# Patient Record
Sex: MALE | Race: WHITE | NOT HISPANIC OR LATINO | Employment: FULL TIME | URBAN - METROPOLITAN AREA
[De-identification: names, ages, dates, MRNs, and addresses within clinical notes are randomized per-mention and may not be internally consistent; named-entity substitution may affect disease eponyms.]

---

## 2017-07-28 ENCOUNTER — ALLSCRIPTS OFFICE VISIT (OUTPATIENT)
Dept: OTHER | Facility: OTHER | Age: 57
End: 2017-07-28

## 2017-07-28 DIAGNOSIS — Z00.00 ENCOUNTER FOR GENERAL ADULT MEDICAL EXAMINATION WITHOUT ABNORMAL FINDINGS: ICD-10-CM

## 2017-07-28 DIAGNOSIS — R09.89 OTHER SPECIFIED SYMPTOMS AND SIGNS INVOLVING THE CIRCULATORY AND RESPIRATORY SYSTEMS: ICD-10-CM

## 2017-07-28 DIAGNOSIS — Z12.5 ENCOUNTER FOR SCREENING FOR MALIGNANT NEOPLASM OF PROSTATE: ICD-10-CM

## 2017-07-28 DIAGNOSIS — K22.4 DYSKINESIA OF ESOPHAGUS: ICD-10-CM

## 2017-07-28 DIAGNOSIS — E78.5 HYPERLIPIDEMIA: ICD-10-CM

## 2017-08-10 ENCOUNTER — APPOINTMENT (OUTPATIENT)
Dept: LAB | Facility: CLINIC | Age: 57
End: 2017-08-10
Payer: COMMERCIAL

## 2017-08-10 ENCOUNTER — GENERIC CONVERSION - ENCOUNTER (OUTPATIENT)
Dept: OTHER | Facility: OTHER | Age: 57
End: 2017-08-10

## 2017-08-10 DIAGNOSIS — K22.4 DYSKINESIA OF ESOPHAGUS: ICD-10-CM

## 2017-08-10 DIAGNOSIS — E78.5 HYPERLIPIDEMIA: ICD-10-CM

## 2017-08-10 DIAGNOSIS — Z12.5 ENCOUNTER FOR SCREENING FOR MALIGNANT NEOPLASM OF PROSTATE: ICD-10-CM

## 2017-08-10 LAB
ALBUMIN SERPL BCP-MCNC: 4 G/DL (ref 3.5–5)
ALP SERPL-CCNC: 35 U/L (ref 46–116)
ALT SERPL W P-5'-P-CCNC: 24 U/L (ref 12–78)
ANION GAP SERPL CALCULATED.3IONS-SCNC: 6 MMOL/L (ref 4–13)
AST SERPL W P-5'-P-CCNC: 23 U/L (ref 5–45)
BASOPHILS # BLD AUTO: 0.02 THOUSANDS/ΜL (ref 0–0.1)
BASOPHILS NFR BLD AUTO: 1 % (ref 0–1)
BILIRUB SERPL-MCNC: 0.81 MG/DL (ref 0.2–1)
BUN SERPL-MCNC: 21 MG/DL (ref 5–25)
CALCIUM SERPL-MCNC: 8.9 MG/DL (ref 8.3–10.1)
CHLORIDE SERPL-SCNC: 102 MMOL/L (ref 100–108)
CHOLEST SERPL-MCNC: 210 MG/DL (ref 50–200)
CO2 SERPL-SCNC: 26 MMOL/L (ref 21–32)
CREAT SERPL-MCNC: 1.08 MG/DL (ref 0.6–1.3)
EOSINOPHIL # BLD AUTO: 0.06 THOUSAND/ΜL (ref 0–0.61)
EOSINOPHIL NFR BLD AUTO: 2 % (ref 0–6)
ERYTHROCYTE [DISTWIDTH] IN BLOOD BY AUTOMATED COUNT: 13.4 % (ref 11.6–15.1)
GFR SERPL CREATININE-BSD FRML MDRD: 76 ML/MIN/1.73SQ M
GLUCOSE P FAST SERPL-MCNC: 87 MG/DL (ref 65–99)
HCT VFR BLD AUTO: 41.7 % (ref 36.5–49.3)
HDLC SERPL-MCNC: 45 MG/DL (ref 40–60)
HGB BLD-MCNC: 14.5 G/DL (ref 12–17)
LDLC SERPL CALC-MCNC: 153 MG/DL (ref 0–100)
LYMPHOCYTES # BLD AUTO: 1.1 THOUSANDS/ΜL (ref 0.6–4.47)
LYMPHOCYTES NFR BLD AUTO: 27 % (ref 14–44)
MCH RBC QN AUTO: 31.5 PG (ref 26.8–34.3)
MCHC RBC AUTO-ENTMCNC: 34.8 G/DL (ref 31.4–37.4)
MCV RBC AUTO: 91 FL (ref 82–98)
MONOCYTES # BLD AUTO: 0.35 THOUSAND/ΜL (ref 0.17–1.22)
MONOCYTES NFR BLD AUTO: 9 % (ref 4–12)
NEUTROPHILS # BLD AUTO: 2.51 THOUSANDS/ΜL (ref 1.85–7.62)
NEUTS SEG NFR BLD AUTO: 61 % (ref 43–75)
NRBC BLD AUTO-RTO: 0 /100 WBCS
PLATELET # BLD AUTO: 212 THOUSANDS/UL (ref 149–390)
PMV BLD AUTO: 10.2 FL (ref 8.9–12.7)
POTASSIUM SERPL-SCNC: 3.9 MMOL/L (ref 3.5–5.3)
PROT SERPL-MCNC: 6.9 G/DL (ref 6.4–8.2)
PSA SERPL-MCNC: 0.7 NG/ML (ref 0–4)
RBC # BLD AUTO: 4.6 MILLION/UL (ref 3.88–5.62)
SODIUM SERPL-SCNC: 134 MMOL/L (ref 136–145)
TRIGL SERPL-MCNC: 60 MG/DL
WBC # BLD AUTO: 4.05 THOUSAND/UL (ref 4.31–10.16)

## 2017-08-10 PROCEDURE — 36415 COLL VENOUS BLD VENIPUNCTURE: CPT

## 2017-08-10 PROCEDURE — G0103 PSA SCREENING: HCPCS

## 2017-08-10 PROCEDURE — 80061 LIPID PANEL: CPT

## 2017-08-10 PROCEDURE — 84402 ASSAY OF FREE TESTOSTERONE: CPT

## 2017-08-10 PROCEDURE — 85025 COMPLETE CBC W/AUTO DIFF WBC: CPT

## 2017-08-10 PROCEDURE — 84403 ASSAY OF TOTAL TESTOSTERONE: CPT

## 2017-08-10 PROCEDURE — 80053 COMPREHEN METABOLIC PANEL: CPT

## 2017-08-11 LAB
TESTOST FREE SERPL-MCNC: 18.9 PG/ML (ref 7.2–24)
TESTOST SERPL-MCNC: 970 NG/DL (ref 264–916)

## 2017-08-14 ENCOUNTER — GENERIC CONVERSION - ENCOUNTER (OUTPATIENT)
Dept: OTHER | Facility: OTHER | Age: 57
End: 2017-08-14

## 2017-08-23 ENCOUNTER — APPOINTMENT (OUTPATIENT)
Dept: NON INVASIVE DIAGNOSTICS | Facility: HOSPITAL | Age: 57
End: 2017-08-23
Payer: COMMERCIAL

## 2017-08-23 ENCOUNTER — HOSPITAL ENCOUNTER (OUTPATIENT)
Dept: NON INVASIVE DIAGNOSTICS | Facility: HOSPITAL | Age: 57
Discharge: HOME | End: 2017-08-23
Payer: COMMERCIAL

## 2017-08-23 ENCOUNTER — HOSPITAL ENCOUNTER (OUTPATIENT)
Dept: CT IMAGING | Facility: HOSPITAL | Age: 57
Discharge: HOME | End: 2017-08-23
Payer: COMMERCIAL

## 2017-08-23 DIAGNOSIS — Z00.00 ENCOUNTER FOR GENERAL ADULT MEDICAL EXAMINATION WITHOUT ABNORMAL FINDINGS: ICD-10-CM

## 2017-08-23 DIAGNOSIS — R09.89 OTHER SPECIFIED SYMPTOMS AND SIGNS INVOLVING THE CIRCULATORY AND RESPIRATORY SYSTEMS: ICD-10-CM

## 2017-08-23 PROCEDURE — 93880 EXTRACRANIAL BILAT STUDY: CPT

## 2017-08-24 ENCOUNTER — GENERIC CONVERSION - ENCOUNTER (OUTPATIENT)
Dept: OTHER | Facility: OTHER | Age: 57
End: 2017-08-24

## 2017-11-10 DIAGNOSIS — E78.5 HYPERLIPIDEMIA: ICD-10-CM

## 2018-01-10 NOTE — PROGRESS NOTES
Assessment    1  Well adult on routine health check (V70 0) (Z00 00)   2  OCD (obsessive compulsive disorder) (300 3) (F42 9)   3  Anxiety (300 00) (F41 9)   4  Never smoker   5  Hyperlipidemia (272 4) (E78 5)   6  Vertigo (780 4) (R42)   7  Tinnitus of both ears (388 30) (H93 13)   8  Esophageal spasm (530 5) (K22 4)   9  Hypogonadism   10  Erectile dysfunction (607 84) (N52 9)   11  Prostate cancer screening (V76 44) (Z12 5)   12  Colon polyps (211 3) (K63 5)   13  Acne (706 1) (L70 9)   14  Cyst   15  Bruit of right carotid artery (785 9) (R09 89)    Plan  Acne    · Benzoyl Peroxide 5 % External Gel; APPLY SPARINGLY TO THE AFFECTED  AREA(S) ONCE OR TWICE DAILY AS DIRECTED  Anxiety, OCD (obsessive compulsive disorder)    · From  Citalopram Hydrobromide 40 MG Oral Tablet TAKE 1 TABLET BY  MOUTH ONCE DAILY To CeleXA 40 MG Oral Tablet (Citalopram Hydrobromide) take  one tablet by mouth daily  Bruit of right carotid artery    · VAS CAROTID COMPLETE STUDY; SIDE:Bilateral; Status:Active; Requested  for:80Nms6435;   Colon polyps    · 1 - Isak Tillman MD, Denise Reyes (Gastroenterology) Co-Management  *  Status: Active  Requested  for: 35OJP5750  Care Summary provided  : Yes  Cyst    · 1 - Taina JACINTO, [facility] Co-Management  *  Status: Active   Requested for: 61BNT3244  Care Summary provided  : Yes  Erectile dysfunction    · Cialis 20 MG Oral Tablet; take one half to one tablet daily as needed  Erectile dysfunction, Hypogonadism    · 1 - Cam JACINTO, Kenji Georges  (Urology) Co-Management  *  Status: Active  Requested for:  08JHI1396  Care Summary provided  : Yes  Esophageal spasm    · Chlordiazepoxide-Clidinium 5-2 5 MG Oral Capsule; TAKE 1 CAPSULE BY  MOUTH TWICE DAILY  Esophageal spasm, Hypogonadism    · (1) CBC/PLT/DIFF; Status:Active; Requested for:11Nxc9195;   Hyperlipidemia    · (1) COMPREHENSIVE METABOLIC PANEL; Status:Active;  Requested for:14Cpg1022;    · (1) LIPID PANEL, FASTING; Status:Active; Requested for:75Nml9843;   Hyperlipidemia, Hypogonadism    · (1) TESTOSTERONE, FREE (DIRECT) AND TOTAL; Status:Active; Requested  for:56Szc9346;   Prostate cancer screening    · (1) PSA (SCREEN) (Dx V76 44 Screen for Prostate Cancer); Status:Active; Requested  for:99Ssb3893; Well adult on routine health check    · CT CORONARY CALCIUM SCORE; Status:Active; Requested for:23Ovz1440; Unlinked    · Atorvastatin Calcium 20 MG Oral Tablet    Discussion/Summary  health maintenance visit Currently, he eats a healthy diet  Prostate cancer screening: PSA was ordered  Patient presents today for his initial visit to our office  Overall, he is in excellent health  He does have history of hyperlipidemia  He has been on statin medication for least a decade  I explained that the guidelines have changed a bit and I would like him to get a baseline lipid panel and we will check his 10 year risk  He remains on testosterone supplementation and I am going to have him see a urologist  He does have a moderately enlarged prostate but no concerning nodules  PSA will be checked as well as a CBC on the testosterone  He has a history of a few colon polyps on colonoscopy about 5 years ago  We do not have the record yet, but he would be due for repeat colonoscopy, so I have referred him to GI  He has a cystic lesion on his back which is probably a persistent sebaceous cyst and I'm referring him to plastic surgery to have this removed  He has some mild erectile dysfunction and I gave him a prescription for Cialis  He has a very slight right-sided carotid bruit and I sent him for a carotid screening ultrasound  He has a history of OCD which is well-controlled with his SSRI  He has a history of esophageal spasm and I refilled his chlordiazepoxide  He is very interested in aggressive vascular screening, so I set him up for a coronary calcium score        Chief Complaint  Physical/ Establish care      History of Present Illness  HM, Adult Male: The patient is being seen for a health maintenance evaluation  General Health: The patient's health since the last visit is described as good  Lifestyle:  He consumes a diverse and healthy diet  Screening:   HPI: The patient presents today for his initial visit to our office  He would like an annual physical  He is a lower at [employer] having moved to the area 1 year ago from [place]  He is originally from [place] and went to the [facility]  He comes today to get established and also require some chronic medication maintenance  He has a history of OCD with some anxiety which has been well controlled with Celexa for many years  He has history of low testosterone and remains on testosterone supplementation  He has had some chronic mild erectile dysfunction  He noted significant improvement in mood and lean body mass with initiation of testosterone several years ago  He was admitted to the hospital overnight in [place] after experiencing severe chest pain a year ago  Fortunately, all of his cardiac testing was negative and was felt to be a esophageal spasm  He was placed on chlordiazepoxide as needed at the time which has been quite helpful  He denies any current problems with heartburn or dysphagia, but does continue to get some intermittent tightness in his chest consistent with that initial episode  He remains quite active with exercise and does not have any problems with chest pain, shortness of breath or diaphoresis with exertion  He has a history of hyperlipidemia has been on atorvastatin for about a decade  He has experienced some acne on his back over the past several years which is quite frustrating  He also has a cystic structure on his left shoulder area that is quite bothersome   At times it has gotten quite large consistent with a sebaceous cyst       Review of Systems    Constitutional: no fever, not feeling poorly, no recent weight gain, no chills, not feeling tired and no recent weight loss  Eyes: no eyesight problems and no dryness of the eyes  Cardiovascular: the heart rate was not slow, no chest pain, no intermittent leg claudication, no palpitations and no extremity edema  Respiratory: no shortness of breath, no cough, no wheezing and no shortness of breath during exertion  Gastrointestinal: no abdominal pain, no nausea, no vomiting, no constipation and no diarrhea  Genitourinary: no dysuria, no urinary hesitancy, no incontinence and no nocturia  Musculoskeletal: no arthralgias, no joint swelling, no myalgias and no joint stiffness  Integumentary: no rashes  Neurological: no headache  Psychiatric: no anxiety and no depression  Hematologic/Lymphatic: no tendency for easy bleeding and no tendency for easy bruising  Surgical History    · History of Hemorrhoidectomy    Family History  Father    · Family history of cerebrovascular accident (CVA) (V17 1) (Z82 3)    Social History    · Never smoker   · No alcohol use    Current Meds   1  Aspirin Low Dose 81 MG TABS; TAKE 1 TABLET DAILY; Therapy: (Recorded:78Wpo3383) to Recorded   2  Atorvastatin Calcium 20 MG Oral Tablet; TAKE 1 TABLET DAILY AS DIRECTED; Therapy: (Recorded:28Jul2017) to Recorded   3  CeleXA 40 MG Oral Tablet; TAKE 1 TABLET BY MOUTH ONCE DAILY; Therapy: (Recorded:24Poe6410) to Recorded   4  Lipitor 20 MG Oral Tablet; TAKE 1 TABLET DAILY; Therapy: (Recorded:18Nwj1965) to Recorded   5  Testosterone Cypionate 200 MG/ML Intramuscular Solution; INJECT 1 ML Intramuscular   every 2 weeks; Therapy: (Recorded:84Bqh5425) to Recorded   6  Vitamin C 1000 MG Oral Tablet; TAKE 1 TABLET DAILY; Therapy: (Recorded:18Yaz6775) to Recorded   7  Vitamin D3 2000 UNIT Oral Capsule; take 1 capsule daily; Therapy: (Recorded:23Mxr1716) to Recorded    Allergies    1   No Known Drug Allergies    Vitals   Recorded: 28Jul2017 02:15PM   Heart Rate 76   Respiration 16   Systolic 301 Diastolic 72   Height 5 ft 6 in   Weight 186 lb    BMI Calculated 30 02   BSA Calculated 1 94     Physical Exam    Constitutional   General appearance: No acute distress, well appearing and well nourished  Head and Face   Head and face: Normal     Palpation of the face and sinuses: No sinus tenderness  Eyes   Conjunctiva and lids: No erythema, swelling or discharge  Pupils and irises: Equal, round, reactive to light  Ophthalmoscopic examination: Normal fundi and optic discs  Ears, Nose, Mouth, and Throat   Otoscopic examination: Tympanic membranes translucent with normal light reflex  Canals patent without erythema  Hearing: Normal     Neck   Neck: Supple, symmetric, trachea midline, no masses  Thyroid: Normal, no thyromegaly  Pulmonary   Respiratory effort: No increased work of breathing or signs of respiratory distress  Palpation of chest: Normal     Auscultation of lungs: Clear to auscultation  Cardiovascular   Auscultation of heart: Normal rate and rhythm, normal S1 and S2, no murmurs  Carotid pulses: 2+ bilaterally  Pedal pulses: 2+ bilaterally  Examination of extremities for edema and/or varicosities: Normal     Abdomen   Abdomen: Non-tender, no masses  Liver and spleen: No hepatomegaly or splenomegaly  Anus, perineum, and rectum: Normal sphincter tone, no masses, no prolapse  Stool sample for occult blood: Negative  Genitourinary   Digital rectal exam of prostate: Abnormal   The prostate was enlarged, but was normal, had no palpable nodules, was nontender and was not fluctuant  Lymphatic   Palpation of lymph nodes in neck: No lymphadenopathy  Musculoskeletal   Gait and station: Normal     Inspection/palpation of digits and nails: Normal without clubbing or cyanosis  Range of motion: Normal     Muscle strength/tone: Normal     Skin   Skin and subcutaneous tissue: Normal without rashes or lesions      Palpation of skin and subcutaneous tissue: Normal juanito  Neurologic   Cranial nerves: Cranial nerves 2-12 intact  Cortical function: Normal mental status  Psychiatric   Judgment and insight: Normal     Orientation to person, place and time: Normal     Recent and remote memory: Intact  Mood and affect: Normal        Results/Data  PHQ-2 Adult Depression Screening 63Gej9930 02:18PM User, Slava     Test Name Result Flag Reference   PHQ-2 Adult Depression Score 0     Over the last two weeks, how often have you been bothered by any of the following problems? Little interest or pleasure in doing things: Not at all - 0  Feeling down, depressed, or hopeless: Not at all - 0   PHQ-2 Adult Depression Screening Negative         Future Appointments    Date/Time Provider Specialty Site   01/12/2018 02:00 PM CRISTOBAL Grewal   Family Medicine [facility] [address]     Signatures   Electronically signed by : CRISTOBAL Alcaraz ; Jul 29 2017  9:13AM EST                       (Author)

## 2018-01-10 NOTE — RESULT NOTES
Verified Results  (1) TESTOSTERONE, FREE (DIRECT) AND TOTAL 10Aug2017 07:23AM Gloria Strauss Order Number: [id number]     Test Name Result Flag Reference   FREE TESTOSTERONE, DIRECT 18 9 pg/mL  7 2 - 24 0   TESTOSTERONE (TOTAL) 970 ng/dL H 264 - 916   **Please note reference interval change**  Adult male reference interval is based on a population of  healthy nonobese males (BMI <30) between 23and 44years old  [address] [place] [address], [address] [place] [address] [phone]  PMID: [id number]      Performed at:  [address]  [id number]  : Aniceto Steiner MD, Phone:  [phone]

## 2018-01-11 NOTE — RESULT NOTES
Verified Results  (1) COMPREHENSIVE METABOLIC PANEL [id number] 77:08TD Lennie Lynne   TW Order Number: [id number]     Test Name Result Flag Reference   SODIUM 134 mmol/L L 136-145   POTASSIUM 3 9 mmol/L  3 5-5 3   CHLORIDE 102 mmol/L  100-108   CARBON DIOXIDE 26 mmol/L  21-32   ANION GAP (CALC) 6 mmol/L  4-13   BLOOD UREA NITROGEN 21 mg/dL  5-25   CREATININE 1 08 mg/dL  0 60-1 30   Standardized to IDMS reference method   CALCIUM 8 9 mg/dL  8 3-10 1   BILI, TOTAL 0 81 mg/dL  0 20-1 00   ALK PHOSPHATAS 35 U/L L    ALT (SGPT) 24 U/L  12-78   Specimen collection should occur prior to Sulfasalazine and/or Sulfapyridine administration due to the potential for falsely depressed results  AST(SGOT) 23 U/L  5-45   Specimen collection should occur prior to Sulfasalazine administration due to the potential for falsely depressed results  ALBUMIN 4 0 g/dL  3 5-5 0   TOTAL PROTEIN 6 9 g/dL  6 4-8 2   eGFR 76 ml/min/1 73sq m     National Kidney Disease Education Program recommendations are as follows:  GFR calculation is accurate only with a steady state creatinine  Chronic Kidney disease less than 60 ml/min/1 73 sq  meters  Kidney failure less than 15 ml/min/1 73 sq  meters  GLUCOSE FASTING 87 mg/dL  65-99   Specimen collection should occur prior to Sulfasalazine administration due to the potential for falsely depressed results  Specimen collection should occur prior to Sulfapyridine administration due to the potential for falsely elevated results  (1) LIPID PANEL, FASTING 10Aug2017 07:23AM Lennie Lynne   TW Order Number: [id number]     Test Name Result Flag Reference   CHOLESTEROL 210 mg/dL H    HDL,DIRECT 45 mg/dL  40-60   Specimen collection should occur prior to Metamizole administration due to the potential for falsley depressed results     LDL CHOLESTEROL CALCULATED 153 mg/dL H 0-100   Triglyceride:        Normal ??? ??? ??? ??? ??? ??? ??? <150 mg/dl   ??? ??? ???Borderline High ??? ??? 150-199 mg/dl   ??? ??? ? ?? High ??? ??? ??? ??? ??? ??? ??? 200-499 mg/dl   ??? ??? ? ??Very High ??? ??? ??? ??? ??? >499 mg/dl      Cholesterol:       Desirable ??? ??? ??? ??? <200 mg/dl   ??? ??? Borderline High ??? 200-239 mg/dl   ??? ??? High ??? ??? ??? ??? ??? ??? >239 mg/dl      HDL Cholesterol:       High ??? ???>59 mg/dL   ??? ??? Low ??? ??? <41 mg/dL      This screening LDL is a calculated result  It does not have the accuracy of the Direct Measured LDL in the monitoring of patients with hyperlipidemia and/or statin therapy  Direct Measure LDL (GXV965) must be ordered separately in these patients  TRIGLYCERIDES 60 mg/dL  <=150   Specimen collection should occur prior to N-Acetylcysteine or Metamizole administration due to the potential for falsely depressed results  (1) CBC/PLT/DIFF 20GGI0151 07:23AM [facility]: [id number]     Test Name Result Flag Reference   WBC COUNT 4 05 Thousand/uL L 4 31-10 16   RBC COUNT 4 60 Million/uL  3 88-5 62   HEMOGLOBIN 14 5 g/dL  12 0-17 0   HEMATOCRIT 41 7 %  36 5-49 3   MCV 91 fL  82-98   MCH 31 5 pg  26 8-34 3   MCHC 34 8 g/dL  31 4-37 4   RDW 13 4 %  11 6-15 1   MPV 10 2 fL  8 9-12 7   PLATELET COUNT 112 Thousands/uL  149-390   nRBC AUTOMATED 0 /100 WBCs     NEUTROPHILS RELATIVE PERCENT 61 %  43-75   LYMPHOCYTES RELATIVE PERCENT 27 %  14-44   MONOCYTES RELATIVE PERCENT 9 %  4-12   EOSINOPHILS RELATIVE PERCENT 2 %  0-6   BASOPHILS RELATIVE PERCENT 1 %  0-1   NEUTROPHILS ABSOLUTE COUNT 2 51 Thousands/? ??L  1 85-7 62   LYMPHOCYTES ABSOLUTE COUNT 1 10 Thousands/? ??L  0 60-4 47   MONOCYTES ABSOLUTE COUNT 0 35 Thousand/? ??L  0 17-1 22   EOSINOPHILS ABSOLUTE COUNT 0 06 Thousand/? ??L  0 00-0 61   BASOPHILS ABSOLUTE COUNT 0 02 Thousands/? ??L  0 00-0 10     (1) PSA (SCREEN) (Dx V76 44 Screen for Prostate Cancer) 10Aug2017 07:23AM Manolo Singer    Order Number: [id number]     Test Name Result Flag Reference   PROSTATE SPECIFIC ANTIGEN 0 7 ng/mL  0 0-4 0   American Urological Association Guidelines define biochemical recurrence of prostate cancer as a detectable or rising PSA value post-radical prostatectomy that is greater than or equal to 0 2 ng/mL with a second confirmatory level of greater than or equal to 0 2 ng/mL

## 2018-01-14 VITALS
HEIGHT: 66 IN | HEART RATE: 76 BPM | WEIGHT: 186 LBS | SYSTOLIC BLOOD PRESSURE: 138 MMHG | DIASTOLIC BLOOD PRESSURE: 72 MMHG | RESPIRATION RATE: 16 BRPM | BODY MASS INDEX: 29.89 KG/M2

## 2018-01-15 NOTE — RESULT NOTES
Verified Results  CT CORONARY CALCIUM SCORE [id number] 08:18AM Dwayne Blood Order Number: [id number]    - Patient Instructions: To schedule this appointment, please contact Central Scheduling at [phone]  Test Name Result Flag Reference   CT CORONARY CALCIUM SCORE (Report)     CT CORONARY ARTERY CALCIUM SCREENING WITHOUT INTRAVENOUS CONTRAST     INDICATION: Z00 00: Encounter for general adult medical examination without abnormal findings (this clinical history is taken directly from the electronic ordering system)  Family history of coronary artery disease  Assess for calcific coronary plaque  TECHNIQUE: Low radiation dose computed tomography of the heart was performed with EKG gating, suspended respiration, and without intravenous contrast  This examination, like all CT scans performed in the [facility], was performed    utilizing techniques to minimize radiation dose exposure, including the use of iterative reconstruction and automated exposure control  Postprocessing was performed on a 3-D computer workstation to measure the amount of calcium in the coronary arteries  Imaging was limited to the heart and the immediately adjacent lungs       FINDINGS:      Coronary artery calcium score breakdown is as follows (note: calcified atherosclerotic plaque in the posterior descending artery is included in right coronary artery score for right dominant circulation and left circumflex score for left dominant    circulation):     Left main coronary artery: 0   Left anterior descending coronary artery and diagonal branches: 15   Left circumflex coronary artery and left marginal branches: 0   Right coronary artery and right marginal branches: 0     TOTAL coronary calcium score: 15   Calcium score PERCENTILE of age, race, and gender matched database participants in the Multi-Ethnic Study of Atherosclerosis (GONZALEZ) trial:  51st       No significant abnormality is identified in the heart or visualized surrounding tissues  IMPRESSION:     Total cardiac score equals 15  For more useful information regarding the prognostic significance of the calcium score, please consult the calculator at the website [email or web address]  aspx  Workstation performed: [id number]     Signed by:   Dago Fuentes MD   8/24/17     VAS CAROTID COMPLETE STUDY [id number] 08:16AM Dwayne Linder Order Number: [id number]    - Patient Instructions: To schedule this appointment, please contact Central Scheduling at [phone]  Test Name Result Flag Reference   VAS CAROTID COMPLETE STUDY (Report)     [facility] REPORT   CLINICAL:   Indications: Bilateral Bruit [R09 89]  Patient presents for a general health evaluation secondary to other   cardiovascular symptoms  Patient is asymptomatic from a cerebral vascular   standpoint  Risk Factors   The patient has history of hyperlipidemia  He has no history of HTN or   Diabetes  Clinical   Right Brachial Pressure: 118/60 mmHg, Left Brachial Pressure: 120/64 mmHg  FINDINGS:      Right    PSV EDV (cm/s) Direction of Flow Ratio    Dist  ICA   68     16           0 56    Mid  ICA   68     18           0 56    Prox  ICA   70     16           0 58    Dist CCA   105     23                 Mid CCA   121     23           0 84    Prox CCA   145     22                 Ext Carotid 107     14           0 88    Prox Vert   56     17 Antegrade            Subclavian  139      0                    Left     PSV EDV (cm/s) Direction of Flow Ratio    Dist  ICA   70     23           0 55    Mid  ICA   97     31           0 77    Prox   ICA   83     28           0 65    Dist CCA   91     17                 Mid CCA   127     17           0 86    Prox CCA   148     24                 Ext Carotid  99     14           0 78    Prox Vert   52     15 Antegrade            Subclavian  148      0                          CONCLUSION:   Impression RIGHT:   There is no evidence of significant stenosis noted  Vertebral artery flow is antegrade  There is no significant subclavian artery   disease  LEFT:   There is no evidence of significant stenosis noted  Vertebral artery flow is antegrade  There is no significant subclavian artery   disease  Internal carotid artery stenosis determination by consensus criteria from:   John Palmer et al  Carotid Artery Stenosis: Gray-Scale and Doppler US Diagnosis   - Society of Radiologists in [address] [place] [address], Radiology 2003;   [phone]        SIGNATURE:   Electronically Signed by: Debbie Tolentino MD on 2017-08-23 09:34:55 PM

## 2018-03-09 ENCOUNTER — OFFICE VISIT (OUTPATIENT)
Dept: FAMILY MEDICINE CLINIC | Facility: CLINIC | Age: 58
End: 2018-03-09
Payer: COMMERCIAL

## 2018-03-09 ENCOUNTER — TRANSCRIBE ORDERS (OUTPATIENT)
Dept: LAB | Facility: CLINIC | Age: 58
End: 2018-03-09

## 2018-03-09 ENCOUNTER — APPOINTMENT (OUTPATIENT)
Dept: LAB | Facility: CLINIC | Age: 58
End: 2018-03-09
Payer: COMMERCIAL

## 2018-03-09 VITALS
DIASTOLIC BLOOD PRESSURE: 60 MMHG | SYSTOLIC BLOOD PRESSURE: 110 MMHG | HEART RATE: 68 BPM | OXYGEN SATURATION: 98 % | HEIGHT: 72 IN | RESPIRATION RATE: 16 BRPM | WEIGHT: 187 LBS | BODY MASS INDEX: 25.33 KG/M2

## 2018-03-09 DIAGNOSIS — E78.00 PURE HYPERCHOLESTEROLEMIA: Primary | ICD-10-CM

## 2018-03-09 DIAGNOSIS — E78.00 PURE HYPERCHOLESTEROLEMIA: ICD-10-CM

## 2018-03-09 DIAGNOSIS — K22.4 ESOPHAGEAL SPASM: ICD-10-CM

## 2018-03-09 DIAGNOSIS — R79.89 LOW SERUM TESTOSTERONE: ICD-10-CM

## 2018-03-09 DIAGNOSIS — F42.9 OBSESSIVE-COMPULSIVE DISORDER, UNSPECIFIED TYPE: ICD-10-CM

## 2018-03-09 PROBLEM — N52.9 ERECTILE DYSFUNCTION: Status: ACTIVE | Noted: 2017-07-28

## 2018-03-09 PROBLEM — E78.5 HYPERLIPIDEMIA: Status: ACTIVE | Noted: 2017-07-28

## 2018-03-09 PROBLEM — K63.5 COLON POLYPS: Status: ACTIVE | Noted: 2017-07-28

## 2018-03-09 PROBLEM — IMO0002 HYPOGONADISM: Status: ACTIVE | Noted: 2017-07-28

## 2018-03-09 PROBLEM — R93.1 ELEVATED CORONARY ARTERY CALCIUM SCORE: Status: ACTIVE | Noted: 2018-03-09

## 2018-03-09 PROBLEM — R42 VERTIGO: Status: ACTIVE | Noted: 2017-07-28

## 2018-03-09 PROBLEM — H93.13 TINNITUS OF BOTH EARS: Status: ACTIVE | Noted: 2017-07-28

## 2018-03-09 PROBLEM — F41.9 ANXIETY: Status: ACTIVE | Noted: 2017-07-28

## 2018-03-09 PROBLEM — R09.89 BRUIT OF RIGHT CAROTID ARTERY: Status: ACTIVE | Noted: 2017-07-28

## 2018-03-09 LAB
ALBUMIN SERPL BCP-MCNC: 4.3 G/DL (ref 3.5–5)
ALP SERPL-CCNC: 42 U/L (ref 46–116)
ALT SERPL W P-5'-P-CCNC: 33 U/L (ref 12–78)
ANION GAP SERPL CALCULATED.3IONS-SCNC: 5 MMOL/L (ref 4–13)
AST SERPL W P-5'-P-CCNC: 27 U/L (ref 5–45)
BILIRUB SERPL-MCNC: 0.68 MG/DL (ref 0.2–1)
BUN SERPL-MCNC: 27 MG/DL (ref 5–25)
CALCIUM SERPL-MCNC: 9.2 MG/DL (ref 8.3–10.1)
CHLORIDE SERPL-SCNC: 102 MMOL/L (ref 100–108)
CO2 SERPL-SCNC: 28 MMOL/L (ref 21–32)
CREAT SERPL-MCNC: 1.01 MG/DL (ref 0.6–1.3)
GFR SERPL CREATININE-BSD FRML MDRD: 82 ML/MIN/1.73SQ M
GLUCOSE P FAST SERPL-MCNC: 82 MG/DL (ref 65–99)
LDLC SERPL DIRECT ASSAY-MCNC: 107 MG/DL (ref 0–100)
POTASSIUM SERPL-SCNC: 4.6 MMOL/L (ref 3.5–5.3)
PROT SERPL-MCNC: 7.4 G/DL (ref 6.4–8.2)
SODIUM SERPL-SCNC: 135 MMOL/L (ref 136–145)

## 2018-03-09 PROCEDURE — 80053 COMPREHEN METABOLIC PANEL: CPT

## 2018-03-09 PROCEDURE — 84403 ASSAY OF TOTAL TESTOSTERONE: CPT

## 2018-03-09 PROCEDURE — 84402 ASSAY OF FREE TESTOSTERONE: CPT

## 2018-03-09 PROCEDURE — 83721 ASSAY OF BLOOD LIPOPROTEIN: CPT

## 2018-03-09 PROCEDURE — 3008F BODY MASS INDEX DOCD: CPT | Performed by: FAMILY MEDICINE

## 2018-03-09 PROCEDURE — 99214 OFFICE O/P EST MOD 30 MIN: CPT | Performed by: FAMILY MEDICINE

## 2018-03-09 PROCEDURE — 36415 COLL VENOUS BLD VENIPUNCTURE: CPT

## 2018-03-09 RX ORDER — LORAZEPAM 0.5 MG/1
0.5 TABLET ORAL EVERY 8 HOURS PRN
Qty: 30 TABLET | Refills: 0 | Status: SHIPPED | OUTPATIENT
Start: 2018-03-09 | End: 2018-10-02 | Stop reason: SDUPTHER

## 2018-03-09 RX ORDER — TESTOSTERONE CYPIONATE 200 MG/ML
200 INJECTION INTRAMUSCULAR
Qty: 10 ML | Refills: 1 | Status: SHIPPED | OUTPATIENT
Start: 2018-03-09

## 2018-03-09 NOTE — ASSESSMENT & PLAN NOTE
Check CMP and direct LDL  He does have some coronary calcium on CT and we should continue with atorvastatin, especially in light of his family history

## 2018-03-09 NOTE — ASSESSMENT & PLAN NOTE
Check testosterone level  I refilled his testosterone today  I would also like him to see Urology at some point

## 2018-03-09 NOTE — PROGRESS NOTES
Assessment/Plan:    Low serum testosterone  Check testosterone level  I refilled his testosterone today  I would also like him to see Urology at some point  Erectile dysfunction    Much improved since on testosterone  Hyperlipidemia    Check CMP and direct LDL  He does have some coronary calcium on CT and we should continue with atorvastatin, especially in light of his family history  Elevated coronary artery calcium score   Continue with risk factor management       Diagnoses and all orders for this visit:    Pure hypercholesterolemia  -     LDL cholesterol, direct; Future  -     Comprehensive metabolic panel; Future    Esophageal spasm  -     LORazepam (ATIVAN) 0 5 mg tablet; Take 1 tablet (0 5 mg total) by mouth every 8 (eight) hours as needed for anxiety    Low serum testosterone  -     Testosterone, free, total; Future  -     Ambulatory referral to Urology; Future  -     testosterone cypionate (DEPO-TESTOSTERONE) 200 mg/mL SOLN; Inject 1 mL (200 mg total) into the shoulder, thigh, or buttocks every 14 (fourteen) days    Obsessive-compulsive disorder, unspecified type          Subjective:      Patient ID: Carline Maradiaga is a 62 y o  male  HPI  Patient presents today for follow-up for some chronic health issues  He has a history of low testosterone  He does remain on IM testosterone supplementation  He feels this has significantly improved his mood, energy as well as some mild erectile dysfunction  He does have a history of OCD with some anxiety and remains on citalopram which may be partially responsible for his erectile dysfunction  He currently denies depression or anxiety  He has a history of esophageal spasm and remains on Librax intermittently  Apparently, his insurance company will no longer cover this      The following portions of the patient's history were reviewed and updated as appropriate: allergies, current medications, past family history, past medical history, past social history, past surgical history and problem list     Review of Systems   Constitutional: Negative for appetite change, chills, fatigue, fever and unexpected weight change  HENT: Negative for trouble swallowing  Eyes: Negative for visual disturbance  Respiratory: Negative for cough, chest tightness, shortness of breath and wheezing  Cardiovascular: Negative for chest pain  Gastrointestinal: Negative for abdominal distention, abdominal pain, blood in stool, constipation and diarrhea  Endocrine: Negative for polyuria  Genitourinary: Negative for difficulty urinating and flank pain  Musculoskeletal: Negative for arthralgias and myalgias  Skin: Negative for rash  Neurological: Negative for dizziness and light-headedness  Hematological: Negative for adenopathy  Does not bruise/bleed easily  Psychiatric/Behavioral: Negative for sleep disturbance  Objective:      /60   Pulse 68   Resp 16   Ht 6' (1 829 m)   Wt 84 8 kg (187 lb)   SpO2 98%   BMI 25 36 kg/m²          Physical Exam   Constitutional: He is oriented to person, place, and time  He appears well-developed and well-nourished  No distress  HENT:   Head: Normocephalic  Eyes: Pupils are equal, round, and reactive to light  Neck: No tracheal deviation present  No thyromegaly present  Cardiovascular: Normal rate, regular rhythm and normal heart sounds  No murmur heard  Pulmonary/Chest: Effort normal  No respiratory distress  He has no wheezes  He has no rales  Abdominal: Soft  He exhibits no distension  There is no tenderness  Musculoskeletal: Normal range of motion  Neurological: He is alert and oriented to person, place, and time  No cranial nerve deficit  Skin: Skin is warm  He is not diaphoretic  Psychiatric: He has a normal mood and affect   Judgment and thought content normal

## 2018-03-12 LAB
TESTOST FREE SERPL-MCNC: 7.2 PG/ML (ref 7.2–24)
TESTOST SERPL-MCNC: 529 NG/DL (ref 264–916)

## 2018-03-20 ENCOUNTER — DOCUMENTATION (OUTPATIENT)
Dept: FAMILY MEDICINE CLINIC | Facility: CLINIC | Age: 58
End: 2018-03-20

## 2018-03-20 NOTE — PROGRESS NOTES
Faxed printed scripts that were refill on pts last OV 3/9/18, to [facility] per pts request (Lorazepam and testosterone cypionate

## 2018-04-03 ENCOUNTER — TELEPHONE (OUTPATIENT)
Dept: FAMILY MEDICINE CLINIC | Facility: CLINIC | Age: 58
End: 2018-04-03

## 2018-04-03 NOTE — TELEPHONE ENCOUNTER
Pt called to inform you that his experancing some adverse reaction to the the testosterone injection he is self administering (acne)  He hasn't done anything different to rule out anything else  He is thinking of trying out possible Del gel  Wants your opinion  I did mention about insurance coverage  Usually are hard to be covered and that he should be seen urology as you been wanting him to see someone in quite some time and would help with this  He states he will set up  Please advise thank you

## 2018-04-04 NOTE — TELEPHONE ENCOUNTER
Called and notified pt as detailed below   He agrees with plan and will move forward with seeing urology

## 2018-04-04 NOTE — TELEPHONE ENCOUNTER
I would suggest Urology consultation or endocrine allergy consultation  Not only will they help decide which medication is best for him, as specialist, they can often get the medications paid for better

## 2018-08-12 DIAGNOSIS — E78.00 HYPERCHOLESTEREMIA: Primary | ICD-10-CM

## 2018-08-13 RX ORDER — ATORVASTATIN CALCIUM 20 MG/1
TABLET, FILM COATED ORAL
Qty: 90 TABLET | Refills: 3 | Status: SHIPPED | OUTPATIENT
Start: 2018-08-13 | End: 2018-10-02 | Stop reason: SDUPTHER

## 2018-10-02 DIAGNOSIS — R93.1 ELEVATED CORONARY ARTERY CALCIUM SCORE: Primary | ICD-10-CM

## 2018-10-02 DIAGNOSIS — F42.9 OBSESSIVE-COMPULSIVE DISORDER, UNSPECIFIED TYPE: ICD-10-CM

## 2018-10-02 DIAGNOSIS — E78.00 HYPERCHOLESTEREMIA: ICD-10-CM

## 2018-10-02 DIAGNOSIS — K22.4 ESOPHAGEAL SPASM: ICD-10-CM

## 2018-10-03 RX ORDER — CITALOPRAM 40 MG/1
40 TABLET ORAL DAILY
Qty: 90 TABLET | Refills: 3 | Status: SHIPPED | OUTPATIENT
Start: 2018-10-03 | End: 2019-08-01 | Stop reason: SDUPTHER

## 2018-10-03 RX ORDER — ATORVASTATIN CALCIUM 20 MG/1
20 TABLET, FILM COATED ORAL DAILY
Qty: 90 TABLET | Refills: 3 | Status: SHIPPED | OUTPATIENT
Start: 2018-10-03 | End: 2019-08-01 | Stop reason: SDUPTHER

## 2018-10-03 RX ORDER — LORAZEPAM 0.5 MG/1
0.5 TABLET ORAL EVERY 8 HOURS PRN
Qty: 90 TABLET | Refills: 0 | Status: SHIPPED | OUTPATIENT
Start: 2018-10-03

## 2019-04-10 ENCOUNTER — TELEPHONE (OUTPATIENT)
Dept: FAMILY MEDICINE CLINIC | Facility: CLINIC | Age: 59
End: 2019-04-10

## 2019-08-01 DIAGNOSIS — F42.9 OBSESSIVE-COMPULSIVE DISORDER, UNSPECIFIED TYPE: ICD-10-CM

## 2019-08-01 DIAGNOSIS — E78.00 HYPERCHOLESTEREMIA: ICD-10-CM

## 2019-08-01 RX ORDER — ATORVASTATIN CALCIUM 20 MG/1
TABLET, FILM COATED ORAL
Qty: 90 TABLET | Refills: 0 | Status: SHIPPED | OUTPATIENT
Start: 2019-08-01

## 2019-08-01 RX ORDER — CITALOPRAM 40 MG/1
TABLET ORAL
Qty: 90 TABLET | Refills: 0 | Status: SHIPPED | OUTPATIENT
Start: 2019-08-01

## 2019-08-02 NOTE — TELEPHONE ENCOUNTER
Spoke w/ pt - he said he is currently in transit right now and will call us back later this afternoon to schedule an appt

## 2019-10-26 DIAGNOSIS — E78.00 HYPERCHOLESTEREMIA: ICD-10-CM

## 2019-10-27 RX ORDER — ATORVASTATIN CALCIUM 20 MG/1
TABLET, FILM COATED ORAL
Qty: 90 TABLET | Refills: 3 | Status: SHIPPED | OUTPATIENT
Start: 2019-10-27